# Patient Record
Sex: FEMALE | Race: ASIAN | URBAN - METROPOLITAN AREA
[De-identification: names, ages, dates, MRNs, and addresses within clinical notes are randomized per-mention and may not be internally consistent; named-entity substitution may affect disease eponyms.]

---

## 2021-11-17 ENCOUNTER — OFFICE VISIT (OUTPATIENT)
Dept: URBAN - METROPOLITAN AREA CLINIC 91 | Facility: CLINIC | Age: 66
End: 2021-11-17
Payer: COMMERCIAL

## 2021-11-17 DIAGNOSIS — B30.8 OTHER VIRAL CONJUNCTIVITIS: ICD-10-CM

## 2021-11-17 DIAGNOSIS — H11.32 SUBCONJUNCTIVAL HEMORRHAGE OF LEFT EYE: Primary | ICD-10-CM

## 2021-11-17 PROCEDURE — 99213 OFFICE O/P EST LOW 20 MIN: CPT | Performed by: OPHTHALMOLOGY

## 2021-11-17 RX ORDER — NEOMYCIN SULFATE, POLYMYXIN B SULFATE AND DEXAMETHASONE 3.5; 10000; 1 MG/ML; [USP'U]/ML; MG/ML
SUSPENSION OPHTHALMIC
Qty: 5 | Refills: 1 | Status: ACTIVE
Start: 2021-11-17

## 2021-11-17 RX ORDER — NEOMYCIN SULFATE, POLYMYXIN B SULFATE AND DEXAMETHASONE 3.5; 10000; 1 MG/ML; [USP'U]/ML; MG/ML
SUSPENSION OPHTHALMIC
Qty: 5 | Refills: 1 | Status: INACTIVE
Start: 2021-11-17 | End: 2021-11-17

## 2021-11-17 ASSESSMENT — INTRAOCULAR PRESSURE
OD: 14
OS: 14

## 2021-11-17 NOTE — IMPRESSION/PLAN
Impression: Other viral conjunctivitis: B30.8. Plan: For viral conjunctivitis, I recommend cool compresses and patient to begin using a good quality artificial tear. I have explained the mechanism of transmission, and patient was educated to wash their hands and use separate towels and bedding. Start Maxitrol gtts TID OS x3 weeks then d/c, if condition does not improve patient to schedule a sooner appt.

## 2021-11-17 NOTE — IMPRESSION/PLAN
Impression: Subconjunctival hemorrhage of left eye: H11.32. Plan: Subconjunctival hemorrhage OS -The condition was discussed with patient, and the patient was reassured. The patient was asked to nNotify his family physician should he have increased bleeding or bruising elsewhere or recurrent subconjunctival hemorrhages.

## 2022-11-16 ENCOUNTER — OFFICE VISIT (OUTPATIENT)
Dept: URBAN - METROPOLITAN AREA CLINIC 91 | Facility: CLINIC | Age: 67
End: 2022-11-16
Payer: COMMERCIAL

## 2022-11-16 DIAGNOSIS — H04.123 DRY EYE SYNDROME OF BILATERAL LACRIMAL GLANDS: Primary | ICD-10-CM

## 2022-11-16 PROCEDURE — 99214 OFFICE O/P EST MOD 30 MIN: CPT | Performed by: OPHTHALMOLOGY

## 2022-11-16 ASSESSMENT — INTRAOCULAR PRESSURE
OS: 14
OD: 13

## 2023-11-15 PROCEDURE — 99213 OFFICE O/P EST LOW 20 MIN: CPT | Performed by: OPHTHALMOLOGY

## 2025-04-02 ENCOUNTER — OFFICE VISIT (OUTPATIENT)
Facility: LOCATION | Age: 70
End: 2025-04-02
Payer: COMMERCIAL

## 2025-04-02 DIAGNOSIS — H04.123 DRY EYE SYNDROME OF BILATERAL LACRIMAL GLANDS: Primary | ICD-10-CM

## 2025-04-02 DIAGNOSIS — H01.009 BLEPHARITIS OF EYELID: ICD-10-CM

## 2025-04-02 PROCEDURE — 99214 OFFICE O/P EST MOD 30 MIN: CPT | Performed by: OPHTHALMOLOGY

## 2025-04-02 RX ORDER — NEOMYCIN SULFATE, POLYMYXIN B SULFATE AND DEXAMETHASONE 1; 3.5; 1 MG/ML; MG/ML; [USP'U]/ML
SUSPENSION OPHTHALMIC
Qty: 5 | Refills: 1 | Status: ACTIVE
Start: 2025-04-02

## 2025-04-02 ASSESSMENT — INTRAOCULAR PRESSURE
OS: 12
OD: 12

## 2025-04-10 ENCOUNTER — OFFICE VISIT (OUTPATIENT)
Facility: LOCATION | Age: 70
End: 2025-04-10
Payer: COMMERCIAL

## 2025-04-10 DIAGNOSIS — H00.015 STYE OF LEFT LOWER EYELID: ICD-10-CM

## 2025-04-10 DIAGNOSIS — H10.022 OTHER MUCOPURULENT CONJUNCTIVITIS, LEFT EYE: Primary | ICD-10-CM

## 2025-04-10 PROCEDURE — 99213 OFFICE O/P EST LOW 20 MIN: CPT | Performed by: OPHTHALMOLOGY

## 2025-04-10 RX ORDER — NEOMYCIN SULFATE, POLYMYXIN B SULFATE AND DEXAMETHASONE 1; 3.5; 1 MG/G; MG/G; [USP'U]/G
OINTMENT OPHTHALMIC
Qty: 3.5 | Refills: 0 | Status: ACTIVE
Start: 2025-04-10

## 2025-04-10 RX ORDER — CIPROFLOXACIN HYDROCHLORIDE 500 MG/1
500 MG TABLET, FILM COATED ORAL
Qty: 14 | Refills: 0 | Status: ACTIVE
Start: 2025-04-10

## 2025-04-10 ASSESSMENT — INTRAOCULAR PRESSURE
OS: 13
OD: 14

## 2025-04-22 ENCOUNTER — OFFICE VISIT (OUTPATIENT)
Facility: LOCATION | Age: 70
End: 2025-04-22
Payer: COMMERCIAL

## 2025-04-22 DIAGNOSIS — H00.015 STYE OF LEFT LOWER EYELID: ICD-10-CM

## 2025-04-22 DIAGNOSIS — H10.022 OTHER MUCOPURULENT CONJUNCTIVITIS, LEFT EYE: Primary | ICD-10-CM

## 2025-04-22 PROCEDURE — 99212 OFFICE O/P EST SF 10 MIN: CPT | Performed by: OPHTHALMOLOGY

## 2025-04-22 ASSESSMENT — INTRAOCULAR PRESSURE
OS: 16
OD: 14